# Patient Record
Sex: FEMALE | Race: WHITE | NOT HISPANIC OR LATINO | Employment: FULL TIME | URBAN - METROPOLITAN AREA
[De-identification: names, ages, dates, MRNs, and addresses within clinical notes are randomized per-mention and may not be internally consistent; named-entity substitution may affect disease eponyms.]

---

## 2024-03-06 ENCOUNTER — DOCUMENTATION (OUTPATIENT)
Dept: CARE COORDINATION | Facility: CLINIC | Age: 44
End: 2024-03-06

## 2024-03-06 NOTE — PROGRESS NOTES
Outreach call to patient to support a smooth transition of care from recent admission.  Spoke with patient, reviewed discharge medications, discharge instructions, assessed social needs, and provided education on importance of follow-up appointment with provider. Enrolled patient in [employer] chatbot for additional support and education through transition period.  Will continue to monitor through transition period.  Medications  Medications reviewed with patient/caregiver?: Yes (3/6/2024 12:10 PM)  Is the patient having any side effects they believe may be caused by any medication additions or changes?: No (3/6/2024 12:10 PM)  Does the patient have all medications ordered at discharge?: Yes (3/6/2024 12:10 PM)  Care Management Interventions: No intervention needed (3/6/2024 12:10 PM)  Is the patient taking all medications as directed (includes completed medication regime)?: Yes (3/6/2024 12:10 PM)    Appointments  Has the patient kept scheduled appointments due by today?: Yes (3/6/2024 12:10 PM)    Patient Teaching  Does the patient have access to their discharge instructions?: Yes (She had no questions about her discharge instructions.  She has a 4 year old at home the the  is in the NICU.   Shr did ask about her akhtar cath, that had a little blood yesterdat, but her urine was clear today.  She thought maybe she did too much) (3/6/2024 12:10 PM)  What is the patient's perception of their health status since discharge?: Improving (3/6/2024 12:10 PM)      stephanie

## 2024-03-20 ENCOUNTER — DOCUMENTATION (OUTPATIENT)
Dept: CARE COORDINATION | Facility: CLINIC | Age: 44
End: 2024-03-20

## 2024-03-20 NOTE — PROGRESS NOTES
Outreach call to patient, her follow up appointment is this Friday.  Will continue to follow.    stephanie

## 2024-04-04 ENCOUNTER — DOCUMENTATION (OUTPATIENT)
Dept: CARE COORDINATION | Facility: CLINIC | Age: 44
End: 2024-04-04

## 2024-04-04 NOTE — PROGRESS NOTES
Attempted outreach call to patient to check in 30 days after hospital discharge to support smooth transition of care.  Left a voice message with my contact information.  No additional outreach needed at this time.   stephanie